# Patient Record
Sex: MALE | Race: WHITE | NOT HISPANIC OR LATINO | Employment: FULL TIME | URBAN - METROPOLITAN AREA
[De-identification: names, ages, dates, MRNs, and addresses within clinical notes are randomized per-mention and may not be internally consistent; named-entity substitution may affect disease eponyms.]

---
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
## 2022-05-03 ENCOUNTER — OFFICE VISIT (OUTPATIENT)
Dept: URGENT CARE | Facility: CLINIC | Age: 22
End: 2022-05-03
Payer: COMMERCIAL

## 2022-05-03 ENCOUNTER — APPOINTMENT (OUTPATIENT)
Dept: RADIOLOGY | Facility: IMAGING CENTER | Age: 22
End: 2022-05-03
Attending: STUDENT IN AN ORGANIZED HEALTH CARE EDUCATION/TRAINING PROGRAM
Payer: COMMERCIAL

## 2022-05-03 VITALS
DIASTOLIC BLOOD PRESSURE: 64 MMHG | HEIGHT: 70 IN | BODY MASS INDEX: 20.76 KG/M2 | WEIGHT: 145 LBS | RESPIRATION RATE: 12 BRPM | TEMPERATURE: 98.2 F | SYSTOLIC BLOOD PRESSURE: 112 MMHG | OXYGEN SATURATION: 97 % | HEART RATE: 78 BPM

## 2022-05-03 DIAGNOSIS — M72.2 PLANTAR FASCIITIS: ICD-10-CM

## 2022-05-03 DIAGNOSIS — M79.671 RIGHT FOOT PAIN: ICD-10-CM

## 2022-05-03 PROCEDURE — 73630 X-RAY EXAM OF FOOT: CPT | Mod: TC,FY,RT | Performed by: STUDENT IN AN ORGANIZED HEALTH CARE EDUCATION/TRAINING PROGRAM

## 2022-05-03 PROCEDURE — 99213 OFFICE O/P EST LOW 20 MIN: CPT | Performed by: STUDENT IN AN ORGANIZED HEALTH CARE EDUCATION/TRAINING PROGRAM

## 2022-05-03 RX ORDER — NAPROXEN 500 MG/1
500 TABLET ORAL 2 TIMES DAILY WITH MEALS
Qty: 14 TABLET | Refills: 0 | Status: SHIPPED | OUTPATIENT
Start: 2022-05-03 | End: 2022-05-10

## 2022-05-03 NOTE — PROGRESS NOTES
Subjective:   Steve Ramachandran is a 22 y.o. male who presents for Foot Pain (Right outer foot pain started yesterday, pt denies injury/trauma )      HPI:  Pleasant 22-year-old male presents clinic for right foot pain that started yesterday.  Patient denies any trauma or injury.  Patient does state that he did jump off of a truck tailgate, but did not have any pain after doing so.  Patient states that he has had similar symptoms approximately 4 months ago that resolved on their own.  Patient states that he does not have any pain when he pushes on the foot but starts to have pain on the lateral outside bottom of the foot with weightbearing and walking.  Patient states this initially pretty good during the beginning of the day but gets worse as the day goes on or the more he walks on it.  Patient denies fever, chills, numbness, tingling, burning, radiation of pain up the leg, inability to bear weight, chest pain, palpitations, lower leg swelling, foot swelling, ecchymosis, erythema, laceration, foreign body, shortness of breath, nausea, vomiting, abdominal pain, diarrhea, dizziness, or headache.  There is no history of gout.  Patient denies any change in diet or medications.  Patient states that he has not used any ibuprofen, Tylenol, or ice to treat his symptoms.      Medications:    • This patient does not have an active medication from one of the medication groupers.    Allergies: Patient has no known allergies.    Problem List: Steve Ramachandran does not have a problem list on file.    Surgical History:  Past Surgical History:   Procedure Laterality Date   • AK REMOVAL OF TONSILS,<15 Y/O         Past Social Hx: Steve Ramachandran  reports that he has quit smoking. He has never used smokeless tobacco. He reports current alcohol use. He reports current drug use. Drugs: Inhaled and Marijuana.     Past Family Hx:  Steve Ramachandran family history is not on file.     Problem list, medications,  "and allergies reviewed by myself today in Epic.     Objective:     /64   Pulse 78   Temp 36.8 °C (98.2 °F) (Temporal)   Resp 12   Ht 1.778 m (5' 10\")   Wt 65.8 kg (145 lb)   SpO2 97%   BMI 20.81 kg/m²     Physical Exam  Vitals reviewed.   Cardiovascular:      Rate and Rhythm: Normal rate and regular rhythm.      Pulses: Normal pulses.      Heart sounds: Normal heart sounds. No murmur heard.  Pulmonary:      Effort: Pulmonary effort is normal. No respiratory distress.      Breath sounds: Normal breath sounds. No stridor. No wheezing, rhonchi or rales.   Feet:      Comments: Right foot: No tenderness to palpation over the foot, inferior posterior pole of the medial malleolus, inferior posterior pole of the lateral malleolus, heel, navicular, or base of the fifth metatarsal.  No erythema, ecchymosis, bony deformity, crepitus, laceration, wound, foreign body, or signs of infection.  No increased warmth or swelling.  Cap refill less than 2 seconds.  2+ pedal pulse.  Normal sensation.  Patient has full range of motion of the ankle and toes.  5 out of 5 strength during plantarflexion, dorsiflexion, eversion, and inversion.  Patient is able to bear weight but does have some discomfort when doing so.  Patient is able to walk with normal gait and no limp.  Skin:     General: Skin is warm and dry.      Capillary Refill: Capillary refill takes less than 2 seconds.      Findings: No erythema, lesion or rash.   Neurological:      General: No focal deficit present.      Mental Status: He is oriented to person, place, and time.         RADIOLOGY RESULTS   DX-FOOT-COMPLETE 3+ RIGHT    Result Date: 5/3/2022  5/3/2022 12:34 PM HISTORY/REASON FOR EXAM:  Foot pain in the right lateral plantar foot for 10 days.. TECHNIQUE/EXAM DESCRIPTION AND NUMBER OF VIEWS:  3 views of the  RIGHT foot. COMPARISON: None FINDINGS: MINERALIZATION: Mineralization is unremarkable for age. INJURY: No acute fracture or gross malalignment is seen. " JOINTS: No erosive arthropathy is evident.     No radiographic evidence of acute osseous abnormality or osseous finding to explain lateral plantar foot pain.           Assessment/Plan:     Diagnosis and associated orders:     1. Right foot pain  DX-FOOT-COMPLETE 3+ RIGHT    naproxen (NAPROSYN) 500 MG Tab   2. Plantar fasciitis  naproxen (NAPROSYN) 500 MG Tab      Comments/MDM:     • Patient patient's presentation and physical exam findings, most likely diagnosis for the patient is acute plantar fasciitis.  Patient states that his symptoms are generally better in the day but get worse as he continues to walk on the foot.  Patient states that he does not know of any trauma, but does state that he jumped off of the [employer].  Discussed with patient that there is the possibility that [facility] a cause extra stretch of the plantar fascia resulting in extra stretching of the plantar fascia and resulting inflammation.  • X-ray showed no radiographic evidence of acute osseous abnormality or osseous finding to explain lateral plantar foot pain.  • Patient states that he mainly came in to get evaluated for possible fracture.  Patient states that he was not sure if he had a stress fracture or not.  • Patient does not want sports medicine referral at this time.  Patient was given prescription for naproxen and she will use this medication and the possible side effects.  Patient has good understanding of this and is agreeable to plan.  Patient should also ice approximately 5 times a day for 15 to 20 minutes at a time.  Patient should rest and elevate the foot as often as possible at home above the level of his heart.  Patient should wear supportive shoe but does not have a lot of flexing it.  Patient may also use over-the-counter orthotic to reduce impact forces.  Patient may also use Tylenol as needed every 6 hours.  • ED precautions were given.  Patient has good understanding of the signs symptoms that  warrant immediate reevaluation.         Differential diagnosis, natural history, supportive care, and indications for immediate follow-up discussed.    Advised the patient to follow-up with the primary care physician for recheck, reevaluation, and consideration of further management.    Please note that this dictation was created using voice recognition software. I have made a reasonable attempt to correct obvious errors, but I expect that there are errors of grammar and possibly content that I did not discover before finalizing the note.    Electronically signed by Christiano Cary PA-C.

## 2022-05-03 NOTE — LETTER
[facility]  [address]  [place] [address]     May 3, 2022    Patient: Steve Ramachandran   YOB: 2000   Date of Visit: 5/3/2022       To Whom It May Concern:    Steve Ramachandran was seen and treated in our department on 5/3/2022. Patient is excused from work on 5/3/22-5/5/22. May return to work on 5/6/22 without restriction.    Sincerely,     Christiano Cary P.A.-C.